# Patient Record
Sex: MALE | Race: WHITE
[De-identification: names, ages, dates, MRNs, and addresses within clinical notes are randomized per-mention and may not be internally consistent; named-entity substitution may affect disease eponyms.]

---

## 2018-07-05 ENCOUNTER — HOSPITAL ENCOUNTER (EMERGENCY)
Dept: HOSPITAL 62 - ER | Age: 23
Discharge: HOME | End: 2018-07-05
Payer: MEDICAID

## 2018-07-05 VITALS — SYSTOLIC BLOOD PRESSURE: 129 MMHG | DIASTOLIC BLOOD PRESSURE: 81 MMHG

## 2018-07-05 DIAGNOSIS — J45.909: ICD-10-CM

## 2018-07-05 DIAGNOSIS — H57.11: ICD-10-CM

## 2018-07-05 DIAGNOSIS — S05.01XA: Primary | ICD-10-CM
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
## 2018-07-05 PROCEDURE — 99283 EMERGENCY DEPT VISIT LOW MDM: CPT

## 2018-07-05 NOTE — ER DOCUMENT REPORT
ED General





- General


Chief Complaint: Eye Problem


Stated Complaint: EYE PAIN


Time Seen by Provider: 07/05/18 02:34


Notes: 





Patient is a 23-year-old male presents with complaint of abrasion to his right 

eye.  Patient says his writing back for truck and the limb hit him in the face.

  He says initially he could see a small black speck in his eye but that since 

washed away.  He says he still has a red spot over his eye does hurt and is 

little bit irritated.  He denies any change in vision.  He denies any other 

injuries.  Denies any fluid leaking from the site.  No other complaints at this 

time.


TRAVEL OUTSIDE OF THE [place] IN LAST 30 DAYS: No





- Related Data


Allergies/Adverse Reactions: 


 





No Known Allergies Allergy (Unverified 03/31/16 17:35)


 











Past Medical History





- Social History


Smoking Status: Unknown if Ever Smoked


Frequency of alcohol use: None


Drug Abuse: None


Family History: Reviewed & Not Pertinent


Pulmonary Medical History: Reports: Hx Asthma


GI Medical History: Reports: Hx Gastroesophageal Reflux Disease


Musculoskeltal Medical History: Reports Hx Musculoskeletal Trauma - Right foot 

fracture


Traumatic Medical History: Reports: Hx Fractures - Right foot


Past Surgical History: Reports: Hx Adenoidectomy, Hx Tonsillectomy





- Immunizations


Immunizations up to date: Yes


Hx Diphtheria, Pertussis, Tetanus Vaccination: Yes





Review of Systems





- Review of Systems


Notes: 





My Normal Review Basic





REVIEW OF SYSTEMS:


CONSTITUTIONAL :  Denies fever,  chills, or sweats.  Denies recent illness.


EENT:   Pain in right eye.


NEUROLOGICAL:  Denies altered mental status or loss of consciousness.  Denies 

headache.  


ALL OTHER SYSTEMS REVIEWED AND NEGATIVE.





Physical Exam





- Vital signs


Vitals: 


 











Temp Pulse Resp BP Pulse Ox


 


 98.9 F   106 H  18   129/81 H  99 


 


 07/05/18 02:27  07/05/18 02:27  07/05/18 02:27  07/05/18 02:27  07/05/18 02:27














- Notes


Notes: 





General Appearance: Well nourished, alert, cooperative, no acute distress, no 

obvious discomfort.  well appearing.


Vitals: reviewed, See vital signs table.


Head: no swelling or tenderness to the head


Eyes: PERRL, EOMI, left conjunctivitis completely clear.  Patient does have a 

area of redness approximately the 3 o'clock position of the right eye.  No 

fluid drainage.  Fluorescein staining shows evidence of corneal abrasion at the 

3 o'clock position.  Negative Tyrell sign.  I do not see evidence of foreign 

body on slit-lamp examination.


Neuro: speech clear, oriented x 3, normal affect, responds appropriately to 

questions.





Course





- Re-evaluation


Re-evalutation: 





07/05/18 05:32


Patient looks well.  Refill is safe to be discharged home.  He has what appears 

to be corneal abrasion.  I will give him erythromycin ointment.  I do not see 

evidence of foreign body.  No evidence of globe rupture or leak.  Encourage him 

to follow-up with the ophthalmologist in 3-4 days if he still having any 

symptoms whatsoever.  Encouraged to return to ER immediately if he has 

decreasing vision, redness or swelling to the eyelid is worsening, abnormal 

drainage from the eye, or if he has any further concerns.  Patient agrees with 

plan and will be discharged home.





Dictation of this chart was performed using voice recognition software; 

therefore, there may be some unintended grammatical errors.





- Vital Signs


Vital signs: 


 











Temp Pulse Resp BP Pulse Ox


 


 98.9 F   106 H  18   129/81 H  99 


 


 07/05/18 02:27  07/05/18 02:27  07/05/18 02:27  07/05/18 02:27  07/05/18 02:27














Discharge





- Discharge


Clinical Impression: 


Corneal abrasion


Qualifiers:


 Encounter type: initial encounter Laterality: right Qualified Code(s): 

S05.01XA - Injury of conjunctiva and corneal abrasion without foreign body, 

right eye, initial encounter





Condition: Good


Disposition: HOME, SELF-CARE


Additional Instructions: 


Please apply a thin layer of the erythromycin to your right eye 5 times a day 

for 4 days. Please follow up with the ophthalmologist, Dr. Romero, if you still 

have any symptoms after 3 days. Please return to the ER immediately if you have 

increasing redness of your eye, abnormal eye drainage, fevers, worsening vision

, or if you feel unwell.


Referrals: 


ABDON ROMERO MD [ACTIVE STAFF] - Follow up in 3-5 days